# Patient Record
Sex: FEMALE | Race: WHITE | NOT HISPANIC OR LATINO | Employment: OTHER | URBAN - METROPOLITAN AREA
[De-identification: names, ages, dates, MRNs, and addresses within clinical notes are randomized per-mention and may not be internally consistent; named-entity substitution may affect disease eponyms.]

---

## 2017-02-09 NOTE — PATIENT DISCUSSION
FOCAL AS SIG EXTRAFOVEAL COMPONENT THEN EYLEA TX AND REPEAT IN 5 WKS TO TRY AND REDUCE EDEMA AND ^ VA

## 2017-02-09 NOTE — PROCEDURE NOTE: CLINICAL
PROCEDURE NOTE: Focal Laser, Retina OS. Diagnosis: Diabetic Macular Edema. Prior to focal laser, risks/benefits/alternatives to laser discussed including loss of vision and patient wished to proceed. An informed consent was obtained and no assurances or guarantees were given. Spot size: 50 um. Power: 140 mW. Number of pulses: 32. Pulse duration: 50 ms. Procedure Time: 3:46 PM. Patient tolerated procedure well. There were no complications. Post procedure instructions given. Patient given office phone number/answering service number and advised to call immediately should there be loss of vision or pain, or should they have any other questions or concerns. Karan Lewis

## 2017-02-13 NOTE — PROCEDURE NOTE: CLINICAL
PROCEDURE NOTE: Eylea 2mg OS. Diagnosis: Diabetic Macular Edema. Anesthesia: Akten Gel 3.5%. Prep: Betadine Drops. Prior to injection, risks/benefits/alternatives discussed including infection, loss of vision, hemorrhage, cataract, glaucoma, retinal tears or detachment. The patient wished to proceed with treatment. Betadine prep was performed. Topical anesthesia was induced with Alcaine. Additional anesthesia was achieved using drop(s) or injection checked above. A drop of Povidone-iodine 5% ophthalmic solution was instilled over the injection site and in the inferior fornix. Using the syringe provided, Eylea 2.0mg in 0.05 cc was injected into the vitreous cavity. The needle was passed 3.0 mm posterior to the limbus in pseudophakic patients, and 3.5 mm posterior to the limbus in phakic patients. Patient tolerated procedure well. There were no complications. Injection time: *. The eye was irrigated with sterile irrigating solution. Post procedure instructions given. The patient was instructed to return for re-evaluation in approximately 4-12 weeks depending on his/her condition and was told to call immediately if vision decreases and/or if his/her eye becomes red, painful, and/or light sensitive. The patient was instructed to go to the emergency room or call 911 if unable to reach the doctor within an hour or two of trying or calling. The patient was instructed to use Artificial Tears q.i.d. p.r.n for comfort. Trace Molina

## 2017-03-20 NOTE — PROCEDURE NOTE: CLINICAL
PROCEDURE NOTE: Eylea 2mg OS. Diagnosis: Diabetic Macular Edema. Anesthesia: Akten Gel 3.5%. Prep: Betadine Drops. Prior to injection, risks/benefits/alternatives discussed including infection, loss of vision, hemorrhage, cataract, glaucoma, retinal tears or detachment. The patient wished to proceed with treatment. Betadine prep was performed. Topical anesthesia was induced with Alcaine. Additional anesthesia was achieved using drop(s) or injection checked above. A drop of Povidone-iodine 5% ophthalmic solution was instilled over the injection site and in the inferior fornix. Using the syringe provided, Eylea 2.0mg in 0.05 cc was injected into the vitreous cavity. The remainder of the Eylea in the single-use vial was then discarded in a medical waste disposal container. The needle was passed 3.0 mm posterior to the limbus in pseudophakic patients, and 3.5 mm posterior to the limbus in phakic patients. Patient tolerated procedure well. There were no complications. Injection time: *. The eye was irrigated with sterile irrigating solution. Post procedure instructions given. The patient was instructed to return for re-evaluation in approximately 4-12 weeks depending on his/her condition and was told to call immediately if vision decreases and/or if his/her eye becomes red, painful, and/or light sensitive. The patient was instructed to go to the emergency room or call 911 if unable to reach the doctor within an hour or two of trying or calling. The patient was instructed to use Artificial Tears q.i.d. p.r.n for comfort. Chicho Garcia

## 2018-04-18 ENCOUNTER — ESTABLISHED COMPREHENSIVE EXAM (OUTPATIENT)
Dept: URBAN - METROPOLITAN AREA CLINIC 36 | Facility: CLINIC | Age: 68
End: 2018-04-18

## 2018-04-18 DIAGNOSIS — H01.003: ICD-10-CM

## 2018-04-18 DIAGNOSIS — H01.006: ICD-10-CM

## 2018-04-18 DIAGNOSIS — H25.813: ICD-10-CM

## 2018-04-18 PROCEDURE — 92310-1 LEVEL 1 CONTACT LENS MANAGEMENT

## 2018-04-18 PROCEDURE — G8427 DOCREV CUR MEDS BY ELIG CLIN: HCPCS

## 2018-04-18 PROCEDURE — 92015 DETERMINE REFRACTIVE STATE: CPT

## 2018-04-18 PROCEDURE — 92310SDW STANDARD DAILY WEAR

## 2018-04-18 PROCEDURE — 92014 COMPRE OPH EXAM EST PT 1/>: CPT

## 2018-04-18 PROCEDURE — 1036F TOBACCO NON-USER: CPT

## 2018-04-18 ASSESSMENT — VISUAL ACUITY
OS_SC: >J10
OU_CC: J1+
OS_PH: 20/30
OD_SC: 20/200
OD_CC: 20/30
OS_SC: 20/80-1
OS_CC: 20/200
OS_CC: J1
OU_CC: 20/20-1
OD_SC: >J10
OD_PH: 20/25
OD_CC: J8

## 2018-04-18 ASSESSMENT — TONOMETRY
OD_IOP_MMHG: 13
OS_IOP_MMHG: 14

## 2019-05-10 ENCOUNTER — ESTABLISHED COMPREHENSIVE EXAM (OUTPATIENT)
Dept: URBAN - METROPOLITAN AREA CLINIC 36 | Facility: CLINIC | Age: 69
End: 2019-05-10

## 2019-05-10 DIAGNOSIS — H01.003: ICD-10-CM

## 2019-05-10 DIAGNOSIS — H25.811: ICD-10-CM

## 2019-05-10 DIAGNOSIS — H25.812: ICD-10-CM

## 2019-05-10 DIAGNOSIS — H52.7: ICD-10-CM

## 2019-05-10 DIAGNOSIS — Z97.3: ICD-10-CM

## 2019-05-10 DIAGNOSIS — H01.006: ICD-10-CM

## 2019-05-10 PROCEDURE — 92015 DETERMINE REFRACTIVE STATE: CPT

## 2019-05-10 PROCEDURE — 92014 COMPRE OPH EXAM EST PT 1/>: CPT

## 2019-05-10 PROCEDURE — 92310-1 LEVEL 1 CONTACT LENS MANAGEMENT

## 2019-05-10 ASSESSMENT — VISUAL ACUITY
OS_SC: >J10
OU_CC: J1
OD_CC: 20/30-2
OD_SC: 20/200
OS_CC: 20/200
OS_PH: 20/60
OD_SC: >J10
OS_CC: J1
OD_CC: J10
OS_SC: 20/80
OU_CC: 20/30-2

## 2019-05-10 ASSESSMENT — TONOMETRY
OD_IOP_MMHG: 12
OS_IOP_MMHG: 12

## 2020-05-14 ENCOUNTER — ESTABLISHED COMPREHENSIVE EXAM (OUTPATIENT)
Dept: URBAN - METROPOLITAN AREA CLINIC 36 | Facility: CLINIC | Age: 70
End: 2020-05-14

## 2020-05-14 DIAGNOSIS — H25.812: ICD-10-CM

## 2020-05-14 DIAGNOSIS — H52.7: ICD-10-CM

## 2020-05-14 DIAGNOSIS — H04.123: ICD-10-CM

## 2020-05-14 DIAGNOSIS — H25.811: ICD-10-CM

## 2020-05-14 PROCEDURE — 92014 COMPRE OPH EXAM EST PT 1/>: CPT

## 2020-05-14 PROCEDURE — 92310-1 LEVEL 1 CONTACT LENS MANAGEMENT

## 2020-05-14 PROCEDURE — 92015 DETERMINE REFRACTIVE STATE: CPT

## 2020-05-14 PROCEDURE — 92310PDW PREMIUM SPECIALTY DAILY WEAR

## 2020-05-14 ASSESSMENT — VISUAL ACUITY
OS_SC: 20/100+1
OD_SC: 20/100
OS_CC: J2
OS_CC: 20/80
OD_CC: J12
OS_PH: 20/30
OS_SC: J12
OD_SC: >J12
OD_CC: 20/25

## 2020-05-14 ASSESSMENT — TONOMETRY
OS_IOP_MMHG: 12
OD_IOP_MMHG: 13

## 2021-05-19 ENCOUNTER — ESTABLISHED COMPREHENSIVE EXAM (OUTPATIENT)
Dept: URBAN - METROPOLITAN AREA CLINIC 36 | Facility: CLINIC | Age: 71
End: 2021-05-19

## 2021-05-19 DIAGNOSIS — H04.123: ICD-10-CM

## 2021-05-19 DIAGNOSIS — Z97.3: ICD-10-CM

## 2021-05-19 DIAGNOSIS — H52.7: ICD-10-CM

## 2021-05-19 DIAGNOSIS — H25.812: ICD-10-CM

## 2021-05-19 DIAGNOSIS — H25.811: ICD-10-CM

## 2021-05-19 PROCEDURE — 92310PDW PREMIUM SPECIALTY DAILY WEAR

## 2021-05-19 PROCEDURE — 92014 COMPRE OPH EXAM EST PT 1/>: CPT

## 2021-05-19 PROCEDURE — 92015 DETERMINE REFRACTIVE STATE: CPT

## 2021-05-19 PROCEDURE — 92310-1 LEVEL 1 CONTACT LENS MANAGEMENT

## 2021-05-19 RX ORDER — OXYMETAZOLINE HYDROCHLORIDE OPHTHALMIC 1 MG/ML: 1 SOLUTION/ DROPS OPHTHALMIC ONCE A DAY

## 2021-05-19 ASSESSMENT — VISUAL ACUITY
OS_CC: J2
OD_CC: J10
OS_PH: 20/30
OD_PH: 20/30
OS_CC: 20/100+2
OS_SC: >J10
OS_SC: 20/100
OD_SC: >J10
OD_CC: 20/40-1
OD_SC: 20/100

## 2021-05-19 ASSESSMENT — TONOMETRY
OS_IOP_MMHG: 16
OD_IOP_MMHG: 16

## 2023-11-25 ENCOUNTER — OFFICE VISIT (OUTPATIENT)
Dept: URGENT CARE | Facility: CLINIC | Age: 73
End: 2023-11-25
Payer: MEDICARE

## 2023-11-25 VITALS
HEART RATE: 82 BPM | OXYGEN SATURATION: 97 % | SYSTOLIC BLOOD PRESSURE: 190 MMHG | HEIGHT: 65 IN | DIASTOLIC BLOOD PRESSURE: 72 MMHG | RESPIRATION RATE: 18 BRPM | TEMPERATURE: 98 F | WEIGHT: 140 LBS | BODY MASS INDEX: 23.32 KG/M2

## 2023-11-25 DIAGNOSIS — S01.81XA CHIN LACERATION, INITIAL ENCOUNTER: Primary | ICD-10-CM

## 2023-11-25 PROCEDURE — 99203 PR OFFICE/OUTPT VISIT, NEW, LEVL III, 30-44 MIN: ICD-10-PCS | Mod: 25,S$GLB,, | Performed by: SURGERY

## 2023-11-25 PROCEDURE — 12011 RPR F/E/E/N/L/M 2.5 CM/<: CPT | Mod: S$GLB,,, | Performed by: SURGERY

## 2023-11-25 PROCEDURE — 99203 OFFICE O/P NEW LOW 30 MIN: CPT | Mod: 25,S$GLB,, | Performed by: SURGERY

## 2023-11-25 PROCEDURE — 12011 LACERATION REPAIR: ICD-10-PCS | Mod: S$GLB,,, | Performed by: SURGERY

## 2023-11-25 RX ORDER — TACROLIMUS 1 MG/G
OINTMENT TOPICAL
COMMUNITY
Start: 2023-11-17

## 2023-11-25 RX ORDER — OXYMETAZOLINE HYDROCHLORIDE OPHTHALMIC 1 MG/ML
1 SOLUTION/ DROPS OPHTHALMIC
COMMUNITY
Start: 2023-11-22

## 2023-11-25 RX ORDER — ATORVASTATIN CALCIUM 40 MG/1
40 TABLET, FILM COATED ORAL
COMMUNITY
Start: 2023-10-20

## 2023-11-25 NOTE — PROCEDURES
Laceration Repair    Date/Time: 11/25/2023 10:20 AM    Performed by: Shabana Dodson MD  Authorized by: Shabana Dodson MD  Body area: head/neck  Location details: chin  Laceration length: 2 cm  Foreign bodies: no foreign bodies  Tendon involvement: none  Nerve involvement: none  Vascular damage: no    Patient sedated: no  Irrigation solution: saline  Irrigation method: syringe  Amount of cleaning: standard  Debridement: none  Degree of undermining: none  Skin closure: glue  Approximation: close  Approximation difficulty: simple  Dressing: open (no dressing)  Patient tolerance: Patient tolerated the procedure well with no immediate complications

## 2023-11-25 NOTE — PROGRESS NOTES
"Subjective:      Patient ID: Shila An is a 73 y.o. female.    Vitals:  height is 5' 5" (1.651 m) and weight is 63.5 kg (140 lb). Her temperature is 98 °F (36.7 °C). Her blood pressure is 190/72 (abnormal) and her pulse is 82. Her respiration is 18 and oxygen saturation is 97%.     Chief Complaint: Laceration    Pt comes in with a laceration to the chin from a fall she had this morning     Laceration   The incident occurred 1 to 3 hours ago. The laceration is located on the Face. Injury mechanism: concrete. The patient is experiencing no pain. She reports no foreign bodies present. Her tetanus status is unknown.       Skin:  Positive for laceration. Negative for erythema.      Objective:     Physical Exam   Constitutional: She is oriented to person, place, and time. She appears well-developed.   HENT:   Head: Normocephalic and atraumatic. Head is without abrasion, without contusion and without laceration.   Ears:   Right Ear: External ear normal.   Left Ear: External ear normal.   Nose: Nose normal.   Mouth/Throat: Oropharynx is clear and moist and mucous membranes are normal.   Eyes: Conjunctivae, EOM and lids are normal. Pupils are equal, round, and reactive to light.   Neck: Trachea normal and phonation normal. Neck supple.   Cardiovascular: Normal rate, regular rhythm and normal heart sounds.   Pulmonary/Chest: Effort normal and breath sounds normal. No stridor. No respiratory distress.   Musculoskeletal: Normal range of motion.         General: Normal range of motion.   Neurological: She is alert and oriented to person, place, and time.   Skin: Skin is warm, dry, intact and no rash. Capillary refill takes less than 2 seconds. Lacerations - head:  headNo abrasion, No burn, No bruising, No erythema and No ecchymosis   Psychiatric: Her speech is normal and behavior is normal. Judgment and thought content normal.   Nursing note and vitals reviewed.    Chin laceration, 2 cm, superficial surrounding " abrasions.     Assessment:     1. Chin laceration, initial encounter        Plan:       Chin laceration, initial encounter  -     Laceration Repair